# Patient Record
Sex: FEMALE | URBAN - METROPOLITAN AREA
[De-identification: names, ages, dates, MRNs, and addresses within clinical notes are randomized per-mention and may not be internally consistent; named-entity substitution may affect disease eponyms.]

---

## 2018-02-07 ENCOUNTER — IMPORTED ENCOUNTER (OUTPATIENT)
Dept: URBAN - METROPOLITAN AREA CLINIC 1 | Facility: CLINIC | Age: 23
End: 2018-02-07

## 2018-02-07 PROBLEM — H57.02: Noted: 2018-02-07

## 2018-02-07 PROBLEM — H11.31: Noted: 2018-02-07

## 2018-02-07 PROCEDURE — 92004 COMPRE OPH EXAM NEW PT 1/>: CPT

## 2018-02-07 PROCEDURE — 92015 DETERMINE REFRACTIVE STATE: CPT

## 2018-02-07 NOTE — PATIENT DISCUSSION
1.  Anisocoria - secondary from Trauma OD. Pupil round and reactive. 2.  Subconjunctival hemorrhage OD -- Resolving. Reassurance given. Condition discussed with patient. Explained that patient is at higher risk for COAG and increased IOP from ocular trauma CD 0.20 OD IOP 15 OD. Dilated fundus exam performed no signs of retinal tear or detachment noted on today's exam. MRX for glasses givenReturn for an appointment in 1 year 27 with Dr. Vlima Mcpherson.

## 2022-04-08 ASSESSMENT — VISUAL ACUITY
OD_CC: 20/40
OS_CC: 20/40

## 2022-04-08 ASSESSMENT — TONOMETRY
OS_IOP_MMHG: 15
OD_IOP_MMHG: 15